# Patient Record
Sex: MALE | Race: AMERICAN INDIAN OR ALASKA NATIVE
[De-identification: names, ages, dates, MRNs, and addresses within clinical notes are randomized per-mention and may not be internally consistent; named-entity substitution may affect disease eponyms.]

---

## 2019-10-15 ENCOUNTER — HOSPITAL ENCOUNTER (EMERGENCY)
Dept: HOSPITAL 5 - ED | Age: 24
Discharge: HOME | End: 2019-10-15
Payer: SELF-PAY

## 2019-10-15 VITALS — SYSTOLIC BLOOD PRESSURE: 136 MMHG | DIASTOLIC BLOOD PRESSURE: 78 MMHG

## 2019-10-15 DIAGNOSIS — Z79.899: ICD-10-CM

## 2019-10-15 DIAGNOSIS — T15.12XA: Primary | ICD-10-CM
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
## 2019-10-15 DIAGNOSIS — J45.909: ICD-10-CM
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
## 2019-10-15 NOTE — EMERGENCY DEPARTMENT REPORT
ED Eye Problem HPI





- General


Chief complaint: Eye Problems


Stated complaint: LFT EYE RED/PAIN


Time Seen by Provider: 10/15/19 19:12


Source: patient


Mode of arrival: Ambulatory


Limitations: No Limitations





- History of Present Illness


Initial comments: 





24-year-old male presents to ED with left eye pain and redness.  Patient states 

he was at work yesterday, lifted a box, and states dentist and debris went into 

his eye.  Patient has been rubbing his eye, having pain and tearing since.  

Patient reports foreign body sensation.  Reports blurred vision.


MD chief complaint: eye pain, eye redness, foreign body


-: days(s) (1)


Location: left eye


Place: work


Eye Symptoms: burning, redness, foreign body sensation, discharge, blurry vision


Severity: moderate


If Pain, Quality: burning


Consistency: constant


Associated Symptoms: none


Treatments Prior to Arrival: none





- Related Data


                                  Previous Rx's











 Medication  Instructions  Recorded  Last Taken  Type


 


Dicyclomine [Bentyl] 10 mg PO QID PRN #40 capsule 07/31/19 Unknown Rx


 


Naproxen [Naprosyn] 500 mg PO BID PRN #30 tablet 07/31/19 Unknown Rx


 


raNITIdine HCl [Zantac] 150 mg PO BID #60 tablet 07/31/19 Unknown Rx


 


Bacitracin [Bacitracin Ophth] 1 applicatio OS BID 5 Days #1 tube 10/15/19 

Unknown Rx











                                    Allergies











Allergy/AdvReac Type Severity Reaction Status Date / Time


 


No Known Allergies Allergy   Verified 10/15/19 22:54














ED Review of Systems


ROS: 


Stated complaint: LFT EYE RED/PAIN


Other details as noted in HPI





Comment: All other systems reviewed and negative


Constitutional: denies: chills, fever


Eyes: eye pain, eye discharge


Neurological: denies: headache





ED Past Medical Hx





- Past Medical History


Previous Medical History?: Yes


Hx Asthma: Yes





- Surgical History


Past Surgical History?: No





- Social History


Smoking Status: Never Smoker


Substance Use Type: None





- Medications


Home Medications: 


                                Home Medications











 Medication  Instructions  Recorded  Confirmed  Last Taken  Type


 


Dicyclomine [Bentyl] 10 mg PO QID PRN #40 capsule 07/31/19  Unknown Rx


 


Naproxen [Naprosyn] 500 mg PO BID PRN #30 tablet 07/31/19  Unknown Rx


 


raNITIdine HCl [Zantac] 150 mg PO BID #60 tablet 07/31/19  Unknown Rx


 


Bacitracin [Bacitracin Ophth] 1 applicatio OS BID 5 Days #1 tube 10/15/19  

Unknown Rx














ED Physical Exam





- General


Limitations: No Limitations


General appearance: alert, in no apparent distress





- Head


Head exam: Present: atraumatic, normocephalic





- Eye


Eye exam: Present: PERRL, EOMI, conjunctival injection (left eye), other (foreig

n body removed from left eye; appears to be a piece of paper/ cardboard; Wood's 

lamp normal)





- ENT


ENT exam: Present: mucous membranes moist





- Neck


Neck exam: Present: normal inspection





- Respiratory


Respiratory exam: Present: normal lung sounds bilaterally.  Absent: respiratory 

distress





- Cardiovascular


Cardiovascular Exam: Present: normal rhythm, bradycardia





- GI/Abdominal


GI/Abdominal exam: Absent: distended





- Extremities Exam


Extremities exam: Present: normal inspection, full ROM





- Neurological Exam


Neurological exam: Present: alert, oriented X3, CN II-XII intact.  Absent: motor

sensory deficit





- Psychiatric


Psychiatric exam: Present: normal affect, normal mood





- Skin


Skin exam: Present: warm, dry, intact, normal color





ED Course


                                   Vital Signs











  10/15/19





  18:00


 


Temperature 98.1 F


 


Pulse Rate 53 L


 


Respiratory 14





Rate 


 


Blood Pressure 136/78


 


O2 Sat by Pulse 98





Oximetry 














ED Medical Decision Making





- Medical Decision Making





- foreign body removed from eye


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


- Prescription given for bacitracin ophthalmic ointment


- Outpatient follow-up is advised, return precautions given





- Differential Diagnosis


foreign body, conjunctivitis, iritis, corneal abrasion


Critical care attestation.: 


If time is entered above; I have spent that time in minutes in the direct care 

of this critically ill patient, excluding procedure time.








ED Disposition


Clinical Impression: 


 Foreign body, eye





Disposition: DC-01 TO HOME OR SELFCARE


Is pt being admited?: No


Condition: Stable


Instructions:  Eye Foreign Body (ED)


Prescriptions: 


Bacitracin [Bacitracin Ophth] 1 applicatio OS BID 5 Days #1 tube


Referrals: 


EVA AGUILERA MD [Staff Physician] - as needed


[facility] [Provider Group] - 3-5 Days


Time of Disposition: 20:15

## 2020-09-12 ENCOUNTER — HOSPITAL ENCOUNTER (EMERGENCY)
Dept: HOSPITAL 5 - ED | Age: 25
Discharge: HOME | End: 2020-09-12
Payer: SELF-PAY

## 2020-09-12 VITALS — DIASTOLIC BLOOD PRESSURE: 70 MMHG | SYSTOLIC BLOOD PRESSURE: 113 MMHG
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
## 2020-09-12 DIAGNOSIS — S91.331A: Primary | ICD-10-CM
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
## 2020-09-12 DIAGNOSIS — F17.200: ICD-10-CM

## 2020-09-12 DIAGNOSIS — L03.115: ICD-10-CM
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
## 2020-09-12 PROCEDURE — 99282 EMERGENCY DEPT VISIT SF MDM: CPT

## 2020-09-12 NOTE — EMERGENCY DEPARTMENT REPORT
ED Lower Extremity HPI





- General


Chief Complaint: Puncture Wound


Stated Complaint: RT FOOT NAIL/PAIN


Time Seen by Provider: 09/12/20 16:04


Source: patient


Mode of arrival: Ambulatory


Limitations: No Limitations





- History of Present Illness


Initial Comments: 





[age]-year-old male male reports stepping on a sharp pain tab at [place] to 3 days 

ago since that time is been developing progressively worsening foot swelling and

pain of unknown etiology.  Pain is not to the point does begin to radiate up his

leg.  None fevers, chills, sweats but the area is more swollen and tender not 

quite responding to over-the-counter medications.


MD Complaint: foot injury





- Related Data


                                  Previous Rx's











 Medication  Instructions  Recorded  Last Taken  Type


 


Dicyclomine [Bentyl] 10 mg PO QID PRN #40 capsule 07/31/19 Unknown Rx


 


Naproxen [Naprosyn] 500 mg PO BID PRN #30 tablet 07/31/19 Unknown Rx


 


raNITIdine HCL [Zantac] 150 mg PO BID #60 tablet 07/31/19 Unknown Rx


 


Bacitracin [Bacitracin Ophth] 1 applicatio OS BID 5 Days #1 tube 10/15/19 

Unknown Rx


 


Chlorhexidine Gluconate [Hibiclens] 10 ml TP BID #240 liquid 09/12/20 Unknown Rx


 


Ketorolac [Toradol] 10 mg PO Q6H PRN #15 tablet 09/12/20 Unknown Rx


 


cephALEXin [Keflex] 500 mg PO Q6HR #40 capsule 09/12/20 Unknown Rx











                                    Allergies











Allergy/AdvReac Type Severity Reaction Status Date / Time


 


No Known Allergies Allergy   Verified 09/12/20 14:31














ED Review of Systems


ROS: 


Stated complaint: RT FOOT NAIL/PAIN


Other details as noted in HPI





Comment: All other systems reviewed and negative





ED Past Medical Hx





- Past Medical History


Hx Asthma: Yes





- Surgical History


Past Surgical History?: No





- Social History


Smoking Status: Current Every Day Smoker


Substance Use Type: None





- Medications


Home Medications: 


                                Home Medications











 Medication  Instructions  Recorded  Confirmed  Last Taken  Type


 


Dicyclomine [Bentyl] 10 mg PO QID PRN #40 capsule 07/31/19  Unknown Rx


 


Naproxen [Naprosyn] 500 mg PO BID PRN #30 tablet 07/31/19  Unknown Rx


 


raNITIdine HCL [Zantac] 150 mg PO BID #60 tablet 07/31/19  Unknown Rx


 


Bacitracin [Bacitracin Ophth] 1 applicatio OS BID 5 Days #1 tube 10/15/19  

Unknown Rx


 


Chlorhexidine Gluconate [Hibiclens] 10 ml TP BID #240 liquid 09/12/20  Unknown 

Rx


 


Ketorolac [Toradol] 10 mg PO Q6H PRN #15 tablet 09/12/20  Unknown Rx


 


cephALEXin [Keflex] 500 mg PO Q6HR #40 capsule 09/12/20  Unknown Rx














ED Physical Exam





- General


Limitations: No Limitations


General appearance: alert, in no apparent distress





- Head


Head exam: Present: atraumatic, normocephalic





- Eye


Eye exam: Present: normal appearance





- ENT


ENT exam: Present: mucous membranes moist





- Neck


Neck exam: Present: normal inspection





- Respiratory


Respiratory exam: Present: normal lung sounds bilaterally.  Absent: respiratory 

distress





- Cardiovascular


Cardiovascular Exam: Present: regular rate, normal rhythm.  Absent: systolic mu

rmur, diastolic murmur, rubs, gallop





- GI/Abdominal


GI/Abdominal exam: Present: soft, normal bowel sounds





- Rectal


Rectal exam: Present: deferred





- Extremities Exam


Extremities exam: Present: normal inspection, tenderness, normal capillary 

refill, other.  Absent: calf tenderness





- Expanded Lower Extremity Exam


  ** Right


Foot/Toe exam: Present: tenderness, swelling





                            __________________________














                            __________________________





 1 - Swollen red tenderness with palpation.  No obvious retained foreign bodies 

visualized.








- Back Exam


Back exam: Present: normal inspection





- Neurological Exam


Neurological exam: Present: alert, oriented X3





- Psychiatric


Psychiatric exam: Present: normal affect, normal mood





- Skin


Skin exam: Present: warm, dry, intact, normal color.  Absent: rash





ED Course





                                   Vital Signs











  09/12/20





  14:35


 


Temperature 99.9 F H


 


Pulse Rate 93 H


 


Respiratory 20





Rate 


 


Blood Pressure 113/70


 


O2 Sat by Pulse 94





Oximetry 











Critical care attestation.: 


If time is entered above; I have spent that time in minutes in the direct care 

of this critically ill patient, excluding procedure time.








ED Disposition


Clinical Impression: 


 Cellulitis of foot, Puncture wound





Disposition: DC-01 TO HOME OR SELFCARE


Is pt being admited?: No


Does the pt Need Aspirin: No


Condition: Stable


Instructions:  Cellulitis (ED), Puncture Wound (ED), Soft Tissue Foreign Body 

(ED)


Prescriptions: 


Chlorhexidine Gluconate [Hibiclens] 10 ml TP BID #240 liquid


cephALEXin [Keflex] 500 mg PO Q6HR #40 capsule


Ketorolac [Toradol] 10 mg PO Q6H PRN #15 tablet


 PRN Reason: Pain


Referrals: 


PRIMARY CARE,MD [Primary Care Provider] - 3-5 Days


[facility] [Provider Group] - 3-5 Days

## 2021-10-17 ENCOUNTER — HOSPITAL ENCOUNTER (EMERGENCY)
Age: 26
Discharge: HOME | End: 2021-10-18
Attending: EMERGENCY MEDICINE
Payer: COMMERCIAL

## 2021-10-17 VITALS
DIASTOLIC BLOOD PRESSURE: 89 MMHG | RESPIRATION RATE: 16 BRPM | HEIGHT: 72 IN | SYSTOLIC BLOOD PRESSURE: 150 MMHG | BODY MASS INDEX: 24.38 KG/M2 | TEMPERATURE: 98.1 F | HEART RATE: 69 BPM | WEIGHT: 180 LBS | OXYGEN SATURATION: 99 %

## 2021-10-17 DIAGNOSIS — K08.89 PAIN, DENTAL: Primary | ICD-10-CM

## 2021-10-17 PROCEDURE — 99283 EMERGENCY DEPT VISIT LOW MDM: CPT

## 2021-10-17 RX ORDER — AMOXICILLIN 250 MG/1
500 CAPSULE ORAL ONCE
Status: COMPLETED | OUTPATIENT
Start: 2021-10-17 | End: 2021-10-18

## 2021-10-17 RX ORDER — AMOXICILLIN 500 MG/1
500 CAPSULE ORAL 2 TIMES DAILY
Qty: 20 CAPSULE | Refills: 0 | Status: SHIPPED | OUTPATIENT
Start: 2021-10-17 | End: 2021-10-27

## 2021-10-17 RX ORDER — IBUPROFEN 600 MG/1
600 TABLET ORAL EVERY 6 HOURS PRN
Qty: 20 TABLET | Refills: 0 | Status: SHIPPED | OUTPATIENT
Start: 2021-10-17

## 2021-10-17 RX ORDER — HYDROCODONE BITARTRATE AND ACETAMINOPHEN 5; 325 MG/1; MG/1
2 TABLET ORAL ONCE
Status: COMPLETED | OUTPATIENT
Start: 2021-10-17 | End: 2021-10-18

## 2021-10-17 ASSESSMENT — ENCOUNTER SYMPTOMS
COUGH: 0
VOMITING: 0
BACK PAIN: 0
DIARRHEA: 0
ABDOMINAL PAIN: 0
SHORTNESS OF BREATH: 0

## 2021-10-18 PROCEDURE — 6370000000 HC RX 637 (ALT 250 FOR IP): Performed by: EMERGENCY MEDICINE

## 2021-10-18 RX ADMIN — AMOXICILLIN 500 MG: 250 CAPSULE ORAL at 00:01

## 2021-10-18 RX ADMIN — HYDROCODONE BITARTRATE AND ACETAMINOPHEN 2 TABLET: 5; 325 TABLET ORAL at 00:01

## 2021-10-18 ASSESSMENT — PAIN SCALES - GENERAL: PAINLEVEL_OUTOF10: 10

## 2021-10-18 NOTE — ED NOTES
Pt discharged in stable condition with prescriptions and dc instructions. Pt ambulates to door with steady gait and without assistance.         Kristian Galicia RN  10/18/21 0004

## 2021-10-18 NOTE — ED PROVIDER NOTES
EMERGENCY DEPARTMENT ENCOUNTER    Pt Name: Christal Oconnor  MRN: [id number]  [place] 1995  Date of evaluation: 10/17/21  CHIEF COMPLAINT       Chief Complaint   Patient presents with    Dental Pain     HISTORY OF PRESENT ILLNESS   HPI     This is a 30-year-old male comes in today with dental pain this is been going on for the past 2 days but today got worse he took some ibuprofen about 2 or 3 hours ago without any improvement of his symptoms he has had issues with this tooth before it is the left upper molar he has not seen a dentist in a while he denies any fevers chills no throat closing sensation he is having a difficult time sleeping secondary to the pain    REVIEW OF SYSTEMS     Review of Systems   Constitutional: Negative for fever. HENT: Positive for dental problem. Negative for congestion. Respiratory: Negative for cough and shortness of breath. Cardiovascular: Negative for chest pain. Gastrointestinal: Negative for abdominal pain, diarrhea and vomiting. Genitourinary: Negative for dysuria. Musculoskeletal: Negative for back pain. Skin: Negative for rash. Neurological: Negative for headaches. All other systems reviewed and are negative. PASTMEDICAL HISTORY   History reviewed. No pertinent past medical history. SURGICAL HISTORY     History reviewed. No pertinent surgical history. CURRENT MEDICATIONS       Previous Medications    No medications on file     ALLERGIES     has no allergies on file. FAMILY HISTORY     has no family status information on file. SOCIAL HISTORY       Social History     Tobacco Use    Smoking status: Never Smoker    Smokeless tobacco: Never Used   Substance Use Topics    Alcohol use:  Yes    Drug use: Yes     Types: Marijuana     PHYSICAL EXAM     INITIAL VITALS: BP (!) 150/89   Pulse 69   Temp 98.1 °F (36.7 °C) (Oral)   Resp 16   Ht 6' (1.829 m)   Wt 180 lb (81.6 kg)   SpO2 99%   BMI 24.41 kg/m²    Physical Exam  Vitals and nursing note reviewed. Constitutional:       General: He is not in acute distress. Appearance: He is well-developed. HENT:      Head: Normocephalic and atraumatic. Mouth/Throat:      Comments: Fractured tooth left upper molar no surrounding swelling, no sublingual swelling, no posterior oropharyngeal swelling, no trismus  Eyes:      Conjunctiva/sclera: Conjunctivae normal.   Cardiovascular:      Rate and Rhythm: Normal rate and regular rhythm. Pulmonary:      Effort: Pulmonary effort is normal. No respiratory distress. Breath sounds: Normal breath sounds. No wheezing or rhonchi. Comments: No stridor  Musculoskeletal:      Cervical back: Neck supple. Skin:     General: Skin is warm and dry. Capillary Refill: Capillary refill takes less than 2 seconds. Neurological:      Mental Status: He is alert. EMERGENCY DEPARTMENTCOURSE:   Differential diagnosis includes dental pain dental infection deep space infection.   Patient clinically appears well do not believe he has a deep space infection at this time will treat with amoxicillin and discharged with anti-inflammatories amoxicillin and a dental clinic list he was strongly urged to follow-up with a dentist.             Verenicefelton Damonn:    Vitals:    10/17/21 2327   BP: (!) 150/89   Pulse: 69   Resp: 16   Temp: 98.1 °F (36.7 °C)   TempSrc: Oral   SpO2: 99%   Weight: 180 lb (81.6 kg)   Height: 6' (1.829 m)       The patient was given the following medications while in the emergency department:  Orders Placed This Encounter   Medications    HYDROcodone-acetaminophen (NORCO) 5-325 MG per tablet 2 tablet    amoxicillin (AMOXIL) capsule 500 mg    amoxicillin (AMOXIL) 500 MG capsule     Sig: Take 1 capsule by mouth 2 times daily for 10 days     Dispense:  20 capsule     Refill:  0    ibuprofen (ADVIL;MOTRIN) 600 MG tablet     Sig: Take 1 tablet by mouth every 6 hours as needed for Pain     Dispense:  20 tablet     Refill:  0         FINAL IMPRESSION      1. Pain, dental         DISPOSITION/PLAN   DISPOSITION Decision To Discharge 10/17/2021 11:43:05 PM      PATIENT REFERRED TO:  [facility] [place] [facility]  [facility] [address]  [phone]    If symptoms worsen    DISCHARGE MEDICATIONS:  New Prescriptions    AMOXICILLIN (AMOXIL) 500 MG CAPSULE    Take 1 capsule by mouth 2 times daily for 10 days    IBUPROFEN (ADVIL;MOTRIN) 600 MG TABLET    Take 1 tablet by mouth every 6 hours as needed for Pain     Jem Dawson MD  Attending Emergency Physician    This charting supersedes any ED resident or staff charting and was written using speech recognition software       Jem Dawson MD  10/17/21 [id number]